# Patient Record
Sex: FEMALE | Race: OTHER | HISPANIC OR LATINO | URBAN - METROPOLITAN AREA
[De-identification: names, ages, dates, MRNs, and addresses within clinical notes are randomized per-mention and may not be internally consistent; named-entity substitution may affect disease eponyms.]

---

## 2022-03-29 ENCOUNTER — EMERGENCY (EMERGENCY)
Facility: HOSPITAL | Age: 72
LOS: 1 days | Discharge: HOME | End: 2022-03-29
Attending: EMERGENCY MEDICINE
Payer: MEDICARE

## 2022-03-29 VITALS
TEMPERATURE: 99 F | OXYGEN SATURATION: 98 % | RESPIRATION RATE: 18 BRPM | HEART RATE: 74 BPM | SYSTOLIC BLOOD PRESSURE: 143 MMHG | DIASTOLIC BLOOD PRESSURE: 76 MMHG

## 2022-03-29 VITALS
TEMPERATURE: 98 F | RESPIRATION RATE: 16 BRPM | HEART RATE: 67 BPM | OXYGEN SATURATION: 98 % | DIASTOLIC BLOOD PRESSURE: 75 MMHG | WEIGHT: 115.96 LBS | SYSTOLIC BLOOD PRESSURE: 149 MMHG

## 2022-03-29 PROCEDURE — 70450 CT HEAD/BRAIN W/O DYE: CPT | Mod: 26,MG

## 2022-03-29 PROCEDURE — 72125 CT NECK SPINE W/O DYE: CPT | Mod: 26,MG

## 2022-03-29 PROCEDURE — 72128 CT CHEST SPINE W/O DYE: CPT | Mod: MG

## 2022-03-29 PROCEDURE — 72125 CT NECK SPINE W/O DYE: CPT | Mod: MG

## 2022-03-29 PROCEDURE — G1004: CPT

## 2022-03-29 PROCEDURE — 72128 CT CHEST SPINE W/O DYE: CPT | Mod: 26,MG

## 2022-03-29 PROCEDURE — 99284 EMERGENCY DEPT VISIT MOD MDM: CPT | Mod: 25

## 2022-03-29 PROCEDURE — 70450 CT HEAD/BRAIN W/O DYE: CPT | Mod: MG

## 2022-03-29 PROCEDURE — 99284 EMERGENCY DEPT VISIT MOD MDM: CPT

## 2022-03-29 NOTE — ED PROVIDER NOTE - NSFOLLOWUPINSTRUCTIONS_ED_ALL_ED_FT
Patient Education   Personalized Prevention Plan  You are due for the preventive services outlined below.  Your care team is available to assist you in scheduling these services.  If you have already completed any of these items, please share that information with your care team to update in your medical record.  Health Maintenance Due   Topic Date Due     Kidney Microalbumin Urine Test  Never done     Diabetic Foot Exam  Never done     ANNUAL REVIEW OF HM ORDERS  Never done     Discuss Advance Care Planning  Never done     Eye Exam  Never done     COVID-19 Vaccine (1) Never done     HIV Screening  Never done     Pneumococcal Vaccine (2 of 4 - PPSV23) 11/18/2017     Zoster (Shingles) Vaccine (2 of 3) 11/18/2017     PAP Smear  05/23/2020     Annual Wellness Visit  06/03/2020     [facility] Lab  02/23/2021        
Please purchase a TLSO (Thoracic Lumbar Sacral Orthosis) back brace from your pharmacy.    Please take tylenol or Motrin as needed for pain    Please follow up with Spine clinic.  You have been given a referral.        Thoracic Spine Fracture       A thoracic spine fracture is a break in one of the bones of the middle part of the back. Thoracic spine fractures can vary from mild to severe. The most severe types are those that:  •Cause the broken bones to move out of place (unstable).      •Injure or press on the spinal cord.      In some cases, the bone that connects to the lower part of the back may also have a break (thoracolumbar fracture).      What are the causes?    This condition may be caused by:  •A motor vehicle collision.      •A fall or jump from a great height.      •A collision during sports.      •Violent acts, such as an assault or gunshot.        What increases the risk?    You are more likely to develop this condition if:  •You have osteoporosis.      •You play contact sports.      •You are in a situation that could result in a fall or other violent injury.        What are the signs or symptoms?    Symptoms of this injury depend on the type of fracture. The main symptom of this condition is back pain that gets worse with movement.    Other symptoms include:  •Trouble standing or walking.      •Numbness.      •Tingling.      •Weakness.      •Loss of movement.      •Loss of bowel or bladder control (incontinence).        How is this diagnosed?    This condition may be diagnosed based on:  •Your symptoms and medical history.      •A physical exam.    •Imaging tests, such as:  •X-rays.      •CT scan.      •MRI.          How is this treated?    Treatment for this injury depends on the type of fracture. If your fracture is stable and does not affect your spinal cord, it may heal with treatments such as:  •Medicines for pain.      •A cast or a brace.      •Physical therapy.      If your fracture is unstable or if it affects your spinal cord, you may need surgery.      Follow these instructions at home:    Medicines     •Take over-the-counter and prescription medicines only as told by your health care provider.      • Do not drive or use heavy machinery while taking pain medicine.    •To prevent or treat constipation while you are taking prescription pain medicine, your health care provider may recommend that you:  •Drink enough fluid to keep your urine pale yellow.      •Take over-the-counter or prescription medicines.      •Eat foods that are high in fiber, such as fresh fruits and vegetables, whole grains, and beans.      •Limit foods that are high in fat and processed sugars, such as fried or sweet foods.        If you have a brace:     •Wear the brace as told by your health care provider. Remove it only as told by your health care provider.      •Keep the brace clean.    •If the brace is not waterproof:  •Do not let it get wet.      •Cover it with a watertight covering when you take a bath or a shower.        Activity     •Stay in bed (on bed rest) only as directed by your health care provider. Being on bed rest for too long can make your condition worse.      •Return to your normal activities as directed by your health care provider. Ask what activities are safe for you.      •Do exercises to improve motion and strength in your back (physical therapy), as recommended by your health care provider.      •Exercise regularly as told by your health care provider.        Managing pain, stiffness, and swelling    •If directed, apply ice to the injured area:  •Put ice in a plastic bag.      •Place a towel between your skin and the bag.      •Leave the ice on for 20 minutes, 2–3 times a day.        General instructions     • Do not use any products that contain nicotine or tobacco, such as cigarettes and e-cigarettes. These can delay healing after injury. If you need help quitting, ask your health care provider.      • Do not drink alcohol. Alcohol can interfere with your treatment.      •Keep all follow-up visits as directed by your health care provider. This is important. It can help to prevent permanent injury, disability, and long-lasting (chronic) pain.        Contact a health care provider if:    •You have a fever.      •You develop a cough that makes your pain worse.      •Your pain medicine is not helping.      •Your pain does not get better over time.      •You cannot return to your normal activities as planned or expected.        Get help right away if:    •Your pain is very bad and it suddenly gets worse.      •You are unable to move any part of your body (paralysis) that is below the level of your injury.      •You have numbness, tingling, or weakness in any part of your body that is below the level of your injury.      •You cannot control your bladder or bowels.        Summary    •A thoracic spine fracture is a break in one of the bones of the middle part of the back.      •Treatment for this injury depends on the type of fracture.      •A stable fracture can be treated with a back brace, activity restrictions, pain medicine, and physical therapy. A more severe break may require surgery.      •Make sure you know what symptoms should cause you to get help right away.      This information is not intended to replace advice given to you by your health care provider. Make sure you discuss any questions you have with your health care provider.      Document Revised: 02/01/2019 Document Reviewed: 02/01/2019    Elsevier Patient Education © 2022 [employer]

## 2022-03-29 NOTE — ED PROVIDER NOTE - OBJECTIVE STATEMENT
71 year old female with no significant PMHx presenting to the ED following a mechanical fall today. Patient states that she was asleep on her couch when she woke up remembering that she had something to do, after which she fell down while rushing to get up. Patient denies experiencing any loss of consciousness during this incident. Per son, he heard the fall and immediately went to check on patient and found her on the floor trying to get up. Patient was reportedly then able to get up with his assistance. In the ED, patient is currently complaining of headache, neck pain, and upper back pain. Patient otherwise denies all other acute complaints. NKDA.

## 2022-03-29 NOTE — ED PROVIDER NOTE - CLINICAL SUMMARY MEDICAL DECISION MAKING FREE TEXT BOX
Patient presenting S/P a fall. Will perform CT imagining of the head, cervical spine, and thoracic spine. Patient declining analgesia at this time. Will reassess.

## 2022-03-29 NOTE — ED PROVIDER NOTE - PATIENT PORTAL LINK FT
You can access the FollowMyHealth Patient Portal offered by [facility] by registering at the following website: [email or web address]. By joining [facility]’s FollowMyHealth portal, you will also be able to view your health information using other applications (apps) compatible with our system.

## 2022-03-29 NOTE — ED PROVIDER NOTE - PROGRESS NOTE DETAILS
CT shows Acute T3 anterior wedge compression fracture.  Case discussed with spine service.  recommend  TLSO (Thoracic Lumbar Sacral Orthosis) back brace and patient can follow up in clinic.  pt neuro intact.  Comfortable with the plan. CT shows Acute T3 anterior wedge compression fracture.  case discussed with spine service.  Recommend dc with back brace and clinic follow up.

## 2022-03-31 PROBLEM — Z78.9 OTHER SPECIFIED HEALTH STATUS: Chronic | Status: ACTIVE | Noted: 2022-03-29

## 2022-04-04 PROBLEM — Z00.00 ENCOUNTER FOR PREVENTIVE HEALTH EXAMINATION: Status: ACTIVE | Noted: 2022-04-04

## 2022-04-05 ENCOUNTER — APPOINTMENT (OUTPATIENT)
Dept: NEUROSURGERY | Facility: CLINIC | Age: 72
End: 2022-04-05
Payer: MEDICARE

## 2022-04-05 VITALS
HEIGHT: 60 IN | OXYGEN SATURATION: 97 % | TEMPERATURE: 97.2 F | HEART RATE: 62 BPM | BODY MASS INDEX: 22.78 KG/M2 | DIASTOLIC BLOOD PRESSURE: 54 MMHG | WEIGHT: 116 LBS | SYSTOLIC BLOOD PRESSURE: 96 MMHG

## 2022-04-05 DIAGNOSIS — M54.9 DORSALGIA, UNSPECIFIED: ICD-10-CM

## 2022-04-05 PROCEDURE — 99203 OFFICE O/P NEW LOW 30 MIN: CPT

## 2022-04-06 PROBLEM — M54.9 PAIN IN BACK: Status: ACTIVE | Noted: 2022-04-06

## 2022-04-06 NOTE — ASSESSMENT
[FreeTextEntry1] : At this point she is neurologically intact. She has minimal pain.and not numbness, tingling or balance problems. I do not think she needs surgery. She will follow up if the pain gets worse.

## 2022-04-06 NOTE — PHYSICAL EXAM
[Person] : oriented to person [Place] : oriented to place [Time] : oriented to time [Short Term Intact] : short term memory intact [Remote Intact] : remote memory intact [Span Intact] : the attention span was normal [Concentration Intact] : normal concentrating ability [Fluency] : fluency intact [Comprehension] : comprehension intact [Current Events] : adequate knowledge of current events [Past History] : adequate knowledge of personal past history [Vocabulary] : adequate range of vocabulary [Cranial Nerves Optic (II)] : visual acuity intact bilaterally,  pupils equal round and reactive to light [Cranial Nerves Trigeminal (V)] : facial sensation intact symmetrically [Cranial Nerves Oculomotor (III)] : extraocular motion intact [Cranial Nerves Facial (VII)] : face symmetrical [Cranial Nerves Vestibulocochlear (VIII)] : hearing was intact bilaterally [Cranial Nerves Glossopharyngeal (IX)] : tongue and palate midline [Cranial Nerves Accessory (XI - Cranial And Spinal)] : head turning and shoulder shrug symmetric [Cranial Nerves Hypoglossal (XII)] : there was no tongue deviation with protrusion [Motor Tone] : muscle tone was normal in all four extremities [Motor Strength] : muscle strength was normal in all four extremities [No Muscle Atrophy] : normal bulk in all four extremities [Sensation Tactile Decrease] : light touch was intact [Abnormal Walk] : normal gait [Balance] : balance was intact [Past-pointing] : there was no past-pointing [Tremor] : no tremor present [2+] : Patella left 2+ [T-Spine ___ (level)] : ~Ulevel [unfilled] thoracic spine